# Patient Record
Sex: MALE | Race: WHITE | Employment: PART TIME | URBAN - METROPOLITAN AREA
[De-identification: names, ages, dates, MRNs, and addresses within clinical notes are randomized per-mention and may not be internally consistent; named-entity substitution may affect disease eponyms.]

---

## 2022-10-18 ENCOUNTER — OFFICE VISIT (OUTPATIENT)
Dept: ORTHOPEDIC SURGERY | Age: 67
End: 2022-10-18
Payer: COMMERCIAL

## 2022-10-18 VITALS — HEIGHT: 65 IN | WEIGHT: 160 LBS | BODY MASS INDEX: 26.66 KG/M2

## 2022-10-18 DIAGNOSIS — M25.521 RIGHT ELBOW PAIN: ICD-10-CM

## 2022-10-18 DIAGNOSIS — M77.11 LATERAL EPICONDYLITIS OF RIGHT ELBOW: Primary | ICD-10-CM

## 2022-10-18 PROCEDURE — 1123F ACP DISCUSS/DSCN MKR DOCD: CPT | Performed by: FAMILY MEDICINE

## 2022-10-18 PROCEDURE — 20551 NJX 1 TENDON ORIGIN/INSJ: CPT | Performed by: FAMILY MEDICINE

## 2022-10-18 PROCEDURE — 99204 OFFICE O/P NEW MOD 45 MIN: CPT | Performed by: FAMILY MEDICINE

## 2022-10-18 RX ORDER — LIDOCAINE HYDROCHLORIDE 10 MG/ML
20 INJECTION, SOLUTION INFILTRATION; PERINEURAL ONCE
Status: COMPLETED | OUTPATIENT
Start: 2022-10-18 | End: 2022-10-18

## 2022-10-18 RX ORDER — MONTELUKAST SODIUM 10 MG/1
TABLET ORAL
COMMUNITY
Start: 2022-09-06

## 2022-10-18 RX ORDER — ATORVASTATIN CALCIUM 20 MG/1
TABLET, FILM COATED ORAL
COMMUNITY
Start: 2022-08-08

## 2022-10-18 RX ORDER — DIPHENHYDRAMINE HCL 25 MG
25 CAPSULE ORAL EVERY 6 HOURS PRN
COMMUNITY

## 2022-10-18 RX ORDER — TAMSULOSIN HYDROCHLORIDE 0.4 MG/1
CAPSULE ORAL
COMMUNITY
Start: 2022-10-15

## 2022-10-18 RX ORDER — BETAMETHASONE SODIUM PHOSPHATE AND BETAMETHASONE ACETATE 3; 3 MG/ML; MG/ML
6 INJECTION, SUSPENSION INTRA-ARTICULAR; INTRALESIONAL; INTRAMUSCULAR; SOFT TISSUE ONCE
Status: COMPLETED | OUTPATIENT
Start: 2022-10-18 | End: 2022-10-18

## 2022-10-18 RX ORDER — TRAZODONE HYDROCHLORIDE 100 MG/1
TABLET ORAL
COMMUNITY
Start: 2022-08-07

## 2022-10-18 RX ADMIN — BETAMETHASONE SODIUM PHOSPHATE AND BETAMETHASONE ACETATE 6 MG: 3; 3 INJECTION, SUSPENSION INTRA-ARTICULAR; INTRALESIONAL; INTRAMUSCULAR; SOFT TISSUE at 12:13

## 2022-10-18 RX ADMIN — LIDOCAINE HYDROCHLORIDE 20 ML: 10 INJECTION, SOLUTION INFILTRATION; PERINEURAL at 12:14

## 2022-10-18 NOTE — PROGRESS NOTES
PROCEDURE NOTE:    DIAGNOSIS    RIGHT elbow pain    PROCEDURE    Ultrasound-guided RIGHT common extensor tendon corticosteroid injection. PROCEDURAL PAUSE    Procedural pause conducted to verify: correct patient identity, procedure to be performed, and as applicable, correct side and site, correct patient position, and availability of implants, special equipment, or special requirements. PROCEDURE DETAILS    The procedure was carried out under sterile technique. Patient Position: Supine    Localization Process: The common extensor tendon was evaluated under ultrasound prior to starting the procedure. The skin was prepped with Betadine and Alcohol. Approach: In-plane. Local Anesthesia: Local anesthesia was obtained with vapocoolant cold spray and 1 cc of 1% lidocaine using a 30-gauge 1-1/4-inch needle to create a skin wheal.    Injection/Aspiration: A 25-gauge 2-inch needle was advanced from an in-plane, lateral to medial approach into the common extensor tendon sheath. After negative aspiration for blood, a mixture of 1 cc of 1% lidocaine and 1 cc of betamethasone (6 mg/cc) was injected into the common extensor tendon with excellent sonographic flow. Images of procedure were permanently recorded. Postprocedure Care: The patient will avoid heavy exertion with the elbow and avoid soaking the elbow under water for two days. The patient will contact me with any problems related to the injection. PATIENT EDUCATION    Ready to learn, no apparent learning barriers were identified; learning preferences include listening. Explained diagnosis and treatment plan; patient expressed understanding of the content. INFORMED CONSENT    Discussed the risks, benefits, alternatives, and the necessity of other members of the healthcare team participating in the procedure. All questions answered and consent given.     Following denial of allergy and review of potential side effects and complications including but not necessarily limited to infection, allergic reaction, local tissue breakdown, aseptic effusion potentially necessitating aspiration and corticosteroid injection, elevation of blood glucose, injury to soft tissue and/or nerves, and seizure, the patient indicated their understanding and agreed to proceed. FOLLOW UP    Follow up in 6-8 weeks.     Electronically signed by Parish Maier MD on 10/18/2022 at 10:05 AM

## 2022-10-18 NOTE — PROGRESS NOTES
[facility]  PRIMARY CARE SPORTS MEDICINE  DATE OF VISIT : 10/18/2022    Patient : Demetrius Bland  Age : 74 y.o.  : 1955  MRN : [id number]   ______________________________________________________________________    Chief Complaint :   Chief Complaint   Patient presents with    Arm Pain     Rt arm pain from bowling, forearm, he states the pain runs from elbow to 3-4th digit; he states felt a pop and was unable to bowl again, happened 3 weeks ago; has been icing, tens unit, ibu - he states that he also plays pool and states wasn't able at 1st but now able; rotation of the arm is bothersome e        HPI : Demetrius Bland is 74 y.o. male who presented to the clinic today for evaluation of right elbow pain. Onset of the symptoms was 3 weeks ago, with no known mechanism of injury, symptoms started while bowling/felt a pop in lateral forearm. Current symptoms include right lateral elbow pain. Patient endorses intermittent numbness and tingling. Pain is aggravated by any repetitive use of the right upper extremity especially gripping activity. Evaluation to date: none. Treatment to date: avoidance of offending activity and OTC analgesics which are not very effective.      Past Medical History :  Past Medical History:   Diagnosis Date    Knee pain, bilateral     Left wrist fracture     1980s    Refusal of blood product     patient was raised a Jehovah Witness    Screening     Tennis elbow     left side     Past Surgical History:   Procedure Laterality Date    COLONOSCOPY N/A 11/30/15    KNEE CARTILAGE SURGERY Left     back in [place], doesn't recall surgeon    WRIST FRACTURE SURGERY Left        Allergies :   No Known Allergies    Medication List :    Current Outpatient Medications   Medication Sig Dispense Refill    montelukast (SINGULAIR) 10 MG tablet TAKE 1 TABLET BY MOUTH ONCE DAILY      traZODone (DESYREL) 100 MG tablet TAKE 1 TABLET BY MOUTH AT BEDTIME      atorvastatin (LIPITOR) 20 MG tablet TAKE 1 TABLET BY MOUTH ONCE DAILY      tamsulosin (FLOMAX) 0.4 MG capsule       diphenhydrAMINE (BENADRYL) 25 MG capsule Take 25 mg by mouth every 6 hours as needed for Itching      Coenzyme Q-10 100 MG CAPS Take by mouth      MULTIPLE VITAMINS PO Take 1 tablet by mouth daily      Garlic 3223 MG CAPS Take 2,000 mg by mouth daily      aspirin 325 MG tablet Take 325 mg by mouth daily Patient takes on own      NONFORMULARY 1.Libido supplement / takes 1 tab daily  2. Supplement to take place of Advil type medication / cure a med / takes 1 tab daily      Calcium-Magnesium-Vitamin D (CALCIUM MAGNESIUM PO) Take 1 tablet by mouth daily      ibuprofen (ADVIL;MOTRIN) 600 MG tablet Take 1 tablet by mouth every 6 hours as needed for Pain. 120 tablet 3     No current facility-administered medications for this visit.      ______________________________________________________________________    Physical Exam :    Vitals: Ht 5' 5\" (1.651 m)   Wt 160 lb (72.6 kg) Comment: per pt  BMI 26.63 kg/m²   General Appearance: Nontoxic, awake, alert, and in no acute distress. Chest wall/Lung: Respirations regular and unlabored. No cyanosis. Heart: RRR, distal pulses intact. Neurologic: Alert&Oriented x3. Sensation grossly intact. No focal motor deficits detected. Musculokeletal: RIGHT Elbow:  ROM - flexion to 140, extension to 0, supination to 90, pronation to 90. Stable to varus and valgus stress. TTP: ( - ) Medial epicondyle, ( + ) Lateral epicondyle, ( - ) olecranon process, ( - ) UCL, ( - ) Pronator mass  Special tests: ( - ) Hook, ( + ) Cozen, ( + ) Fuller, ( + ) Chair lift, ( - ) Thinkers, ( - ) Milking maneuver, ( - ) Moving Valgus Stress Test, ( - ) VEO, ( - ) Ulnar Tinel's, ( - ) Ulnar nerve subluxation, ( - ) Lacertus   ______________________________________________________________________    Assessment & Plan :    1. Right elbow pain  2.  Lateral epicondylitis of right elbow  Patient presents to the office today for evaluation of right elbow pain. History, referring provider note, physical exam and imaging (as interpreted by me) are consistent with lateral epicondylitis/common extensor tendinitis. Treatment options discussed with patient in the office today including activity modification, oral anti-inflammatories, physical therapy, injection options, advanced imaging in the form of a MRI and referral to an orthopedic surgeon for discussion of surgical opinion. Patient wishes to proceed with conservative treatment in the form of an ultrasound-guided corticosteroid injection which was performed in the office today, please see separate procedure note for further details. Patient will follow up in 6 weeks for reevaluation of symptoms and consider escalation therapy should symptoms persist.  Patient is agreeable with above plan all questions and concerns were addressed in the office today. - US GUIDED NEEDLE PLACEMENT; Future  - lidocaine 1 % injection 20 mL  - betamethasone acetate-betamethasone sodium phosphate (CELESTONE) injection 6 mg    Return to Office: Return in about 6 weeks (around 11/29/2022) for injection follow up.     Boom Negrete MD

## 2024-07-25 ENCOUNTER — OFFICE VISIT (OUTPATIENT)
Dept: ORTHOPEDIC SURGERY | Age: 69
End: 2024-07-25

## 2024-07-25 DIAGNOSIS — M79.642 PAIN OF LEFT HAND: Primary | ICD-10-CM

## 2024-07-25 RX ORDER — TRIAMCINOLONE ACETONIDE 40 MG/ML
40 INJECTION, SUSPENSION INTRA-ARTICULAR; INTRAMUSCULAR ONCE
Status: COMPLETED | OUTPATIENT
Start: 2024-07-25 | End: 2024-07-25

## 2024-07-25 RX ORDER — LIDOCAINE HYDROCHLORIDE 10 MG/ML
1 INJECTION, SOLUTION INFILTRATION; PERINEURAL ONCE
Status: COMPLETED | OUTPATIENT
Start: 2024-07-25 | End: 2024-07-25

## 2024-07-25 RX ADMIN — LIDOCAINE HYDROCHLORIDE 1 ML: 10 INJECTION, SOLUTION INFILTRATION; PERINEURAL at 11:44

## 2024-07-25 RX ADMIN — TRIAMCINOLONE ACETONIDE 40 MG: 40 INJECTION, SUSPENSION INTRA-ARTICULAR; INTRAMUSCULAR at 11:45

## 2024-07-25 NOTE — PROGRESS NOTES
[facility]  PRIMARY CARE SPORTS MEDICINE  DATE OF VISIT : 2024    Patient : Marco Merino  Age : 69 y.o.   : 1955  MRN : [id number]   ______________________________________________________________________    Chief Complaint :   Chief Complaint   Patient presents with    Hand Pain     Left  thumb  area  about 3 months ago  Having trouble lifting like a coffee cup     sharp pain         HPI : Marco Merino is 69 y.o. male who presented to the clinic today for evaluation of left hand/thumb pain. Onset of the symptoms was 3 months ago, with no known mechanism of injury.  Current symptoms include pain and swelling.  Patient denies numbness and tingling.  Pain is aggravated by any weight bearing especially deep squats. Evaluation to date: XRs of the left hand which demonstrate no acute fractures or dislocations. Treatment to date: avoidance of offending activity and OTC analgesics which are not very effective.     Past Medical History :  Past Medical History:   Diagnosis Date    Knee pain, bilateral     Left wrist fracture     1980s    Refusal of blood product     patient was raised a Jehovah Witness    Screening     Tennis elbow     left side     Past Surgical History:   Procedure Laterality Date    COLONOSCOPY N/A 11/30/15    KNEE CARTILAGE SURGERY Left     back in [place], doesn't recall surgeon    WRIST FRACTURE SURGERY Left        Allergies :   No Known Allergies    Medication List :    Current Outpatient Medications   Medication Sig Dispense Refill    montelukast (SINGULAIR) 10 MG tablet TAKE 1 TABLET BY MOUTH ONCE DAILY      traZODone (DESYREL) 100 MG tablet TAKE 1 TABLET BY MOUTH AT BEDTIME      atorvastatin (LIPITOR) 20 MG tablet TAKE 1 TABLET BY MOUTH ONCE DAILY      tamsulosin (FLOMAX) 0.4 MG capsule       diphenhydrAMINE (BENADRYL) 25 MG capsule Take 25 mg by mouth every 6 hours as needed for Itching      Coenzyme Q-10 100 MG CAPS Take by mouth      MULTIPLE VITAMINS PO Take 1 tablet by

## 2024-07-25 NOTE — PROGRESS NOTES
PROCEDURE NOTE:    DIAGNOSIS    LEFT thumb pain.    PROCEDURE    Ultrasound-guided LEFT first carpometacarpal (CMC) joint corticosteroid injection.    PROCEDURAL PAUSE    Procedural pause conducted to verify: correct patient identity, procedure to be performed, and as applicable, correct side and site, correct patient position, and availability of implants, special equipment, or special requirements.    PROCEDURE DETAILS    The procedure was carried out under sterile technique.    Patient Position: Supine.    Localization Process: The thumb CMC joint was evaluated under ultrasound prior to starting the procedure. The skin was prepped with Betadine and Alcohol.    Approach: Out-of-plane.    Local Anesthesia: Local anesthesia was obtained with vapocoolant cold spray and 1 cc of 1% lidocaine using a 30-gauge 1-1/4-inch needle to create a skin wheal.    Injection/Aspiration: A 25-gauge 2-inch needle was advanced from an out-of-plane approach into the thumb CMC joint. After visualization of the needle tip in the target area and negative aspiration for blood, a mixture of 0.5 cc of 1% lidocaine and 0.5 cc of Kenalog (40 mg/cc) was injected into the thumb CMC joint with excellent sonographic flow. Images of procedure were permanently recorded.    Postprocedure Care: The patient will avoid heavy exertion with the hand and avoid soaking the hand under water for two days. The patient will contact me with any problems related to the injection.    PATIENT EDUCATION    Ready to learn, no apparent learning barriers were identified; learning preferences include listening. Explained diagnosis and treatment plan; patient expressed understanding of the content.    INFORMED CONSENT    Discussed the risks, benefits, alternatives, and the necessity of other members of the healthcare team participating in the procedure. All questions answered and consent given.    Following denial of allergy and review of potential side effects and